# Patient Record
Sex: MALE | Race: WHITE
[De-identification: names, ages, dates, MRNs, and addresses within clinical notes are randomized per-mention and may not be internally consistent; named-entity substitution may affect disease eponyms.]

---

## 2021-02-09 ENCOUNTER — HOSPITAL ENCOUNTER (OUTPATIENT)
Dept: HOSPITAL 92 - BICRAD | Age: 31
Discharge: HOME | End: 2021-02-09
Attending: FAMILY MEDICINE
Payer: COMMERCIAL

## 2021-02-09 DIAGNOSIS — R07.9: Primary | ICD-10-CM

## 2021-02-09 PROCEDURE — 71046 X-RAY EXAM CHEST 2 VIEWS: CPT

## 2021-02-09 NOTE — RAD
TWO VIEW CHEST:

 

HISTORY: 

Chest pain.

 

FINDINGS: Lung fields are clear.  Heart and mediastinum appear normal.  Osseous structures appear nor
mal.

 

IMPRESSION: 

No acute abnormality.

 

POS: AGW see attending attestation